# Patient Record
Sex: FEMALE | Race: WHITE
[De-identification: names, ages, dates, MRNs, and addresses within clinical notes are randomized per-mention and may not be internally consistent; named-entity substitution may affect disease eponyms.]

---

## 2019-06-06 ENCOUNTER — HOSPITAL ENCOUNTER (OUTPATIENT)
Dept: HOSPITAL 43 - DL.US | Age: 29
End: 2019-06-06
Attending: FAMILY MEDICINE
Payer: COMMERCIAL

## 2019-06-06 DIAGNOSIS — Z3A.11: ICD-10-CM

## 2019-06-06 DIAGNOSIS — O26.841: Primary | ICD-10-CM

## 2019-06-06 DIAGNOSIS — O20.8: ICD-10-CM

## 2019-12-26 ENCOUNTER — HOSPITAL ENCOUNTER (INPATIENT)
Dept: HOSPITAL 43 - DL.OBCHECK | Age: 29
LOS: 2 days | Discharge: HOME | DRG: 560 | End: 2019-12-28
Attending: FAMILY MEDICINE | Admitting: FAMILY MEDICINE
Payer: COMMERCIAL

## 2019-12-26 DIAGNOSIS — Z3A.40: ICD-10-CM

## 2019-12-26 DIAGNOSIS — D64.9: ICD-10-CM

## 2019-12-26 DIAGNOSIS — O48.0: Primary | ICD-10-CM

## 2019-12-26 NOTE — OBOUT
DATE:  12/26/2019

 

TIME OF NST:  6:12 to 6:32.

 

REASON FOR NST:

1. Intrauterine pregnancy at 40 weeks by 6 and 1/7 weeks ultrasound.

2. GBS negative.

3. Maternal anemia with hemoglobin of 11.3 upon admission.

4. Gestational hypertension versus preeclampsia versus transient hypertension.

5. History of asthma.

6. G2, P1-0-0-1.

 

NST INTERPRETATION:  During this time period, fetal heart tone baseline is

approximately 155 and there are at least two 15 x 15 beats per minute

accelerations making this strip reactive.  It is also noted to be reassuring.

Tocometer reveals potential of 1 contraction not felt by the patient.

 

Blood pressure 157/74, recheck 146/76; heart rate between 97 to 98; temperature

97.1.

 

ASSESSMENT:

1. Nonstress test, reactive and reassuring.

2. Tocometer with 1 contraction, not felt by the patient.

 

PLAN:  Please see H and P, which was done through epic.  Please see chart to be

scanned, updated sticker was placed.

 

CONCERNS:  New diagnosis includes transient hypertension versus gestational

hypertension versus preeclampsia and just recent recheck of blood pressure is

151/66 with a heart rate of 87.  We will do PIH panel.  The patient currently

denies any headaches, visual changes, or upper abdominal pain.  For her H and P,

records were called for, reviewed, and supplemented by the patient's history as

well as review of systems remarkable for a mild cold, mild swelling in the feet.

No contractions, spotting, bleeding, leaking, headaches, visual changes, or

upper abdominal pain.  Otherwise, review of systems fully reviewed and felt to

be noncontributory.

 

DD:  12/26/2019 07:50:34

DT:  12/26/2019 13:15:35

[facility]

Job #:  [id number]

## 2019-12-26 NOTE — PCM.SN
- Free Text/Narrative


Note: 





Intrathecal. Sitting position, sterile prep and drape. 15 mg lidocaine w bicarb 

for skinwheal to L2 L3 interspace x 2, introducer and 24 ga pencan x 2. pos CSF

, neg heme, neg parasthesia. 0.1 ml pf 1:1000 epi, 15 mcg pf sufenta, 35 mcg pf 

fentanyl, 0.4 ml pf ns and 6 mg of 0.75% pf bupivacaine injected after CSF 

aspiration. Pt to L lateral position. Procedure time 2320 to 2350

## 2019-12-26 NOTE — PN
DATE:  12/26/2019

 

SUBJECTIVE:  The patient is feeling contractions.

 

OBJECTIVE:  Vital Signs:  Last blood pressure 151/66, heart rate 87.

Genitourinary:  Fetal heart tones in the 140s to 150s range.  Tocometer reveals

occasional contraction, none felt by patient.  Vaginal exam reveals her to be

1.5 cm, 60% effaced, -2 to -3 station, vertex suspected, and Cytotec 25 mcg was

placed after discussion with the patient.

 

ASSESSMENT AND PLAN:

1. Intrauterine pregnancy at 40 weeks by 6 and 1/7 weeks ultrasound, GBS

    negative, G2, P1-0-0-1.

2. History of asthma with gestational hypertension versus preeclampsia versus

    transient hypertension.

 

We will continue to follow clinically and closely for signs and symptoms of

severe preeclampsia.  PIH panel will be done and follow thereafter.  The patient

understands and agrees with the above treatment plan.

 

DD:  12/26/2019 07:51:44

DT:  12/26/2019 11:00:26

[facility]

Job #:  [id number]

## 2019-12-27 PROCEDURE — 0HQ9XZZ REPAIR PERINEUM SKIN, EXTERNAL APPROACH: ICD-10-PCS | Performed by: FAMILY MEDICINE

## 2019-12-27 PROCEDURE — 10H07YZ INSERTION OF OTHER DEVICE INTO PRODUCTS OF CONCEPTION, VIA NATURAL OR ARTIFICIAL OPENING: ICD-10-PCS | Performed by: FAMILY MEDICINE

## 2019-12-27 PROCEDURE — 10907ZC DRAINAGE OF AMNIOTIC FLUID, THERAPEUTIC FROM PRODUCTS OF CONCEPTION, VIA NATURAL OR ARTIFICIAL OPENING: ICD-10-PCS | Performed by: FAMILY MEDICINE

## 2019-12-27 RX ADMIN — VITAMIN A, ASCORBIC ACID, CHOLECALCIFEROL, .ALPHA.-TOCOPHEROL ACETATE, DL-, THIAMINE MONONITRATE, RIBOFLAVIN, NIACINAMIDE, PYRIDOXINE HYDROCHLORIDE, FOLIC ACID, CYANOCOBALAMIN, CALCIUM CARBONATE, IRON, ZINC OXIDE, AND CUPRIC OXIDE SCH EACH: 4000; 120; 400; 22; 1.84; 3; 20; 10; 1; 12; 200; 29; 25; 2 TABLET ORAL at 10:45

## 2019-12-27 NOTE — OBOUT
DATE:  2019

 

SUBJECTIVE:  The patient is now status post intrathecal.  She is feeling

comfortable, but is nauseated.

 

OBJECTIVE:  O2 sats 97%.  Fetal heart tones in the 120s to 130s range with what

appears to be early decelerations, but tocometer has poor readings.  Vaginal

exam reveals her to be 6 to 7 cm.  Artificial rupture of membranes done after

discussion with the patient.  IUPC placed for further monitoring of contractions

after discussion with the patient.  Copious amounts of fluid were obtained with

some bloody show noted.  IUPC was placed without difficulty and tested with

cough with a spike noted.

 

Pitocin has been stopped due to the concerns with fetal heart tones, and her

nausea has been managed with CRNA.

 

ASSESSMENT:  Intrauterine pregnancy, now at 40-1/7 weeks by 6-1/7 week

ultrasound.  Group B Streptococcus negative.  Gestational hypertension.  

2, para 1-0-0-1 with history of asthma, now nearing the second stage of labor

with rapid cervical dilation noted to be 4 cm, approximately an hour to an hour

a half ago and now at 6 to 7 cm with artificial rupture of membranes and

intrauterine pressure catheter placed for further monitoring.

 

PLAN:  We will continue to follow clinically and closely at this point in time.

Plans were discussed with the patient.  She understands and agrees.

 

DD:  2019 00:25:37

DT:  2019 01:56:28

[facility]

Job #:  [id number]

## 2019-12-27 NOTE — DEL
DATE:  2019

 

PREOPERATIVE DIAGNOSES:

1. Intrauterine pregnancy at 40-1/7 weeks by 6-1/7 week ultrasound.

2. Group B Streptococcus negative.

3. Maternal anemia with hemoglobin 11.3 upon admission.

4. Gestational hypertension with preeclampsia ruled out.

5. History of asthma.

6.  2, para 1-0-0-1.

 

POSTOPERATIVE DIAGNOSES:

1. Intrauterine pregnancy at 40-1/7 weeks by 6-1/7 week ultrasound-delivered.

2. Group B Streptococcus negative.

3. Maternal anemia with hemoglobin 11.3 upon admission.

4. Gestational hypertension with preeclampsia ruled out.

5. History of asthma.

6.  2, para 1-0-0-1.

7. First-degree perineal laceration that was bleeding requiring repair.

 

PROCEDURE PERFORMED:  On 2019, NST followed by Cytotec x1, Pitocin

augmentation on 2019, artificial rupture membranes, intrauterine pressure

catheter placement, and spontaneous vaginal delivery with first-degree perineal

laceration, repaired.

 

ANESTHESIA/ANALGESIA:  The patient did receive nitrous oxide in the first stage

of labor as well as an intrathecal.

 

ESTIMATED BLOOD LOSS:  200 mL.

 

FINDINGS:  Male.  Apgar score and weight pending.

 

SUMMARY OF EVENTS:  The patient is a G2, P1-0-0-1 intrauterine pregnancy at 40

weeks on date of admission, underwent NST followed by Cytotec x1 and then

Pitocin augmentation.  She slowly progressed with cervical dilation.  She was

found to be 4+ cm, wanting something for pain and received an intrathecal.

Thereafter, she became very nauseated and was followed closely.  She did receive

a dose of Pepcid as well as another dose of Zofran.  During that time period,

she was evaluated and found to have rapid dilation to 7 cm and then 8 cm.  I was

called to the room as there were concerns with some decelerations.

Repositioning and oxygen were done with minimal improvement.  Therefore, she was

evaluated and found to be complete and the patient started pushing with

contractions.  With approximately 2 to 3 contractions with pushing, fetal vertex

was delivered in WILLIAM presentation followed by anterior and posterior shoulder

without difficulty, and the rest of the infant was delivered.  Mouth and nares

were suctioned.  Cord was doubly clamped and cut, and infant was resuscitated on

mother's abdomen.

 

Approximately 10 mL of cord blood was obtained for  labs.  Placenta was

then delivered with gentle cord traction and fundal massage within 5 to 10

minutes.  Perineum, vagina, and perirectal areas were then examined and noted to

have a first-degree perineal laceration that was bleeding.  This was

subsequently repaired in the usual fashion using 3-0 Vicryl with good anesthetic

result from her intrathecal.  Mother and infant are currently stable at the time

of dictation.

 

DD:  2019 01:26:14

DT:  2019 01:52:07

[facility]

Job #:  [id number]

## 2019-12-27 NOTE — PN
DATE:  12/26/2019

 

SUBJECTIVE:  The patient's contractions are getting stronger.  She is on Pitocin

6 milliunits per minute.  Was unable to receive her second dose of Cytotec today

because of her contraction pattern.  Last blood pressure 143/78.  The patient

denies any headaches, visual changes, or upper abdominal pain.  Preeclampsia

labs came back negative for preeclampsia.  Fetal heart tones 140s to 150s with

accelerations in the 160s to 170s range.  Tocometer reveals contractions every 1-

1/2 to 2 minutes.  Vaginal exam reveals her to be 60% to 70% effaced, 2 cm

dilated, vertex suspected, and bag of water felt.  She has had a 0 station at

this point in time.

 

ASSESSMENT AND PLAN:  Intrauterine pregnancy at 40 weeks by 6-1/7 weeks'

ultrasound, now status post Cytotec x1 and currently on Pitocin augmentation

with some cervical change.  We will continue to follow clinically and closely.

The patient understands and agrees with the above treatment plan.  We will

follow for any signs or symptoms of severe preeclampsia as well.

 

DD:  12/26/2019 17:00:13

DT:  12/26/2019 18:29:51

[facility]

Job #:  [id number]

## 2019-12-28 VITALS — DIASTOLIC BLOOD PRESSURE: 84 MMHG | SYSTOLIC BLOOD PRESSURE: 140 MMHG | HEART RATE: 88 BPM

## 2019-12-28 RX ADMIN — VITAMIN A, ASCORBIC ACID, CHOLECALCIFEROL, .ALPHA.-TOCOPHEROL ACETATE, DL-, THIAMINE MONONITRATE, RIBOFLAVIN, NIACINAMIDE, PYRIDOXINE HYDROCHLORIDE, FOLIC ACID, CYANOCOBALAMIN, CALCIUM CARBONATE, IRON, ZINC OXIDE, AND CUPRIC OXIDE SCH EACH: 4000; 120; 400; 22; 1.84; 3; 20; 10; 1; 12; 200; 29; 25; 2 TABLET ORAL at 08:07

## 2019-12-28 NOTE — DISCH
ADMITTING DIAGNOSES:

1. Intrauterine pregnancy at 40 weeks' gestation based on 6-week ultrasound.

2. Group B strep negative.

3. Anemia of pregnancy, hemoglobin 11.3 on admission.

4. Gestational hypertension.

5. History of asthma.

6.  2, para 1-0-0-1.

 

DISCHARGE DIAGNOSES:

1. Intrauterine pregnancy at 40 weeks' gestation based on 6-week ultrasound.

2. Group B strep negative.

3. Anemia of pregnancy, hemoglobin 11.3 on admission.

4. Gestational hypertension.

5. History of asthma.

6.  2, now para 2-0-0-2, delivered at 40-1/7 weeks' gestation.

7. Status post spontaneous vaginal delivery with first-degree laceration

    repair.

 

PROCEDURES PERFORMED:

1. Induction of labor with Cytotec and Pitocin.

2. Artificial rupture of membranes.

3. Intrauterine pressure catheter monitoring.

4. Intrathecal anesthesia.

5. Spontaneous vaginal delivery.

6. First-degree laceration repair.

 

BRIEF HISTORY:  A 29-year-old female with the above-listed diagnoses, brought

into the hospital on her due date for elective induction of labor at term, found

to have some elevated blood pressures, and PIH labs ultimately negative.

Protein-creatinine ratio of 0.07.  Delivery itself was slow initial progress,

but went well overall.  She was complete and had an uncomplicated vaginal

delivery.  See delivery note for full details.  She had a term male infant

weighing 3610 g, Apgar scores of 8 and 9.

 

DISCHARGE CONDITION:  Good.  The patient is meeting discharge criteria,

ambulating, tolerating regular diet.  No chest pain or shortness of breath.

Bleeding has been controlled, and she has no other concerns.

 

PHYSICAL EXAMINATION:

Vital Signs:  Temperature is 98.4, pulse 88, blood pressure 140/84, respiratory

rate of 16, O2 saturations 99% on room air.  Previous blood pressure was 135/81.

Heart:  Regular without obvious murmur.

Lungs:  Clear to auscultation bilaterally.

Abdomen:  Soft and nontender.  Fundus is firm and below the umbilicus.

Extremities:  Trace edema.  No erythema or tenderness noted.

 

LABORATORY DATA:  Admission hemoglobin 11.3, discharge 11.0; hematocrit 34.1,

discharge 33; platelets 247, discharge 233.  PIH labs were negative,

specifically protein-creatinine ratio of 0.07.

 

DISPOSITION:  Home with family.

 

MEDICATIONS:

1. Ibuprofen 800 mg every 8 hours as needed for pain.

2. Tylenol 650 mg every 6 hours as needed for pain.

3. Iron 325 mg twice daily.

4. Colace 100 mg p.o. twice daily as needed for constipation.

5. Prenatal vitamin, continue 1 daily.

 

DISCHARGE INSTRUCTIONS:  The patient has a blood pressure cuff at home and will

continue to monitor her blood pressures.  They are minimally elevated, and I

expect them to resolve over the next few days, and she can recheck them in the

office on Monday when she brings her baby in to see Dr. Macedo.  She also is

educated on signs and symptoms of preeclampsia and that this can occur even in

the postpartum time-period.  So if she needs to be evaluated for symptoms or if

her blood pressures are getting higher, specifically higher than 150/95, that

she should come back in for re-evaluation, and the patient is agreeable to that

and her questions have been answered.  Otherwise, she has also been provided

with routine post vaginal delivery postpartum cares and all of her questions

were answered.

 

DD:  2019 15:04:14

DT:  2019 15:27:46

[facility]

Job #:  [id number]

## 2019-12-31 ENCOUNTER — HOSPITAL ENCOUNTER (EMERGENCY)
Dept: HOSPITAL 43 - DL.ED | Age: 29
Discharge: HOME | End: 2019-12-31
Payer: COMMERCIAL

## 2019-12-31 VITALS — HEART RATE: 104 BPM | SYSTOLIC BLOOD PRESSURE: 143 MMHG | DIASTOLIC BLOOD PRESSURE: 90 MMHG

## 2019-12-31 DIAGNOSIS — J45.909: ICD-10-CM

## 2019-12-31 DIAGNOSIS — R10.30: Primary | ICD-10-CM

## 2019-12-31 DIAGNOSIS — Z79.899: ICD-10-CM

## 2019-12-31 LAB
ANION GAP SERPL CALC-SCNC: 16 MMOL/L
CHLORIDE SERPL-SCNC: 103 MMOL/L (ref 101–111)
SODIUM SERPL-SCNC: 136 MMOL/L (ref 135–145)

## 2019-12-31 PROCEDURE — 87086 URINE CULTURE/COLONY COUNT: CPT

## 2019-12-31 PROCEDURE — 96360 HYDRATION IV INFUSION INIT: CPT

## 2019-12-31 PROCEDURE — 87186 SC STD MICRODIL/AGAR DIL: CPT

## 2019-12-31 PROCEDURE — 36415 COLL VENOUS BLD VENIPUNCTURE: CPT

## 2019-12-31 PROCEDURE — 85025 COMPLETE CBC W/AUTO DIFF WBC: CPT

## 2019-12-31 PROCEDURE — 74019 RADEX ABDOMEN 2 VIEWS: CPT

## 2019-12-31 PROCEDURE — 99284 EMERGENCY DEPT VISIT MOD MDM: CPT

## 2019-12-31 PROCEDURE — 87088 URINE BACTERIA CULTURE: CPT

## 2019-12-31 PROCEDURE — 81001 URINALYSIS AUTO W/SCOPE: CPT

## 2019-12-31 PROCEDURE — 80053 COMPREHEN METABOLIC PANEL: CPT

## 2019-12-31 NOTE — EDM.PDOC
Scribed by Екатерина Rizvi 12/31/19 1233 for Sanchez Jack PA





ED HPI GENERAL MEDICAL PROBLEM





- General


Chief Complaint: Abdominal Pain


Stated Complaint: ABDOMINAL PAIN


Time Seen by Provider: 12/31/19 11:45


Source of Information: Reports: Patient, RN, RN Notes Reviewed


History Limitations: Reports: No Limitations





- History of Present Illness


INITIAL COMMENTS - FREE TEXT/NARRATIVE: 


Patient is a 29-year-old female who reports with lower abdominal "cramping pain

". The patient reports her pain started this A.M. at 10:00. The patient has an 

appointment at the clinic at 13:00, but thought the pain was too bad, so came 

to ED. Patient had a vaginal birth on 12/27/19 and teran had some cramping since. 

The patient reports she took one 200mg Ibuprofen but still ha cramping. 


Onset: Today


Duration: Getting Worse


Location: Reports: Abdomen


Quality: Reports: Ache


Severity: Mild


Improves with: Reports: None


Worsens with: Reports: None


Associated Symptoms: Reports: No Other Symptoms


Treatments PTA: Reports: Acetaminophen


  ** Suprapubic


Pain Score (Numeric/FACES): 9





- Related Data


 Allergies











Allergy/AdvReac Type Severity Reaction Status Date / Time


 


No Known Allergies Allergy   Verified 12/26/19 09:25











Home Meds: 


 Home Meds





Ferrous Sulfate 325 mg PO BID 12/26/19 [History]


Prenatal Vit/FA/Fe Fumarate/Se [Prenatal MTR] 1 tab PO DAILY 12/26/19 [History]


Acetaminophen [Tylenol] 650 mg PO Q4H PRN  tablet 12/28/19 [Rx]


Docusate Sodium [Colace] 100 mg PO BID PRN  cap 12/28/19 [Rx]


Ibuprofen [Motrin] 800 mg PO Q8H PRN  tablet 12/28/19 [Rx]











Past Medical History


HEENT History: Reports: None


Cardiovascular History: Reports: None


Respiratory History: Reports: Asthma


Gastrointestinal History: Reports: Other (See Below)


Other Gastrointestinal History: ulcer


Genitourinary History: Reports: None


OB/GYN History: Reports: Pregnancy


Musculoskeletal History: Reports: None


Neurological History: Reports: None


Psychiatric History: Reports: None


Endocrine/Metabolic History: Reports: None


Hematologic History: Reports: Anemia


Immunologic History: Reports: None


Oncologic (Cancer) History: Reports: None


Dermatologic History: Reports: None





- Infectious Disease History


Infectious Disease History: Reports: None





- Past Surgical History


Head Surgeries/Procedures: Reports: None





Social & Family History





- Family History


Family Medical History: Noncontributory





- Tobacco Use


Smoking Status *Q: Never Smoker


Second Hand Smoke Exposure: No





- Caffeine Use


Caffeine Use: Reports: None





- Recreational Drug Use


Recreational Drug Use: No





- Living Situation & Occupation


Living situation: Reports: with Family


Occupation: Employed





ED ROS GENERAL





- Review of Systems


Review Of Systems: Comprehensive ROS is negative, except as noted in HPI.





ED EXAM, GI/ABD





- Physical Exam


Exam: See Below


Exam Limited By: No Limitations


General Appearance: Alert, WD/WN, No Apparent Distress


Eyes: Bilateral: Normal Appearance


Ears: Normal External Exam, Normal Canal, Hearing Grossly Normal, Normal TMs


Nose: Normal Inspection, Normal Mucosa, No Blood


Throat/Mouth: Normal Inspection, Normal Lips, Normal Teeth, Normal Gums, Normal 

Oropharynx, Normal Voice, No Airway Compromise


Head: Atraumatic, Normocephalic


Neck: Normal Inspection, Supple, Non-Tender, Full Range of Motion


Respiratory/Chest: No Respiratory Distress, Lungs Clear, Normal Breath Sounds, 

No Accessory Muscle Use, Chest Non-Tender


Cardiovascular: Normal Peripheral Pulses, Regular Rate, Rhythm, No Edema, No 

Gallop, No JVD, No Murmur, No Rub


GI/Abdominal Exam: Other (lower abdominal tenderness)


 (Female) Exam: Deferred


Rectal (Female) Exam: Deferred


Back Exam: Normal Inspection, Full Range of Motion, NT


Extremities: Normal Inspection, Normal Range of Motion, Non-Tender, Normal 

Capillary Refill, No Pedal Edema


Neurological: Alert, Oriented, CN II-XII Intact, Normal Cognition, Normal Gait, 

Normal Reflexes, No Motor/Sensory Deficits


Psychiatric: Normal Affect, Normal Mood


Skin Exam: Warm, Dry, Intact, Normal Color, No Rash


Lymphatic: No Adenopathy





Course





- Vital Signs


Last Recorded V/S: 


 Last Vital Signs











Temp  36.3 C   12/31/19 11:30


 


Pulse  104 H  12/31/19 11:30


 


Resp  18   12/31/19 11:30


 


BP  143/90 H  12/31/19 11:30


 


Pulse Ox  97   12/31/19 11:30














- Orders/Labs/Meds


Orders: 


 Active Orders 24 hr











 Category Date Time Status


 


 UA RFX RAVINDRA AND CULT IF INDIC [URIN] Stat Lab  12/31/19 11:26 Ordered


 


 Sodium Chloride 0.9% [Normal Saline] 1,000 ml Med  12/31/19 11:45 Ordered





 IV .BOLUS   








 Medication Orders





Sodium Chloride (Normal Saline)  1,000 mls @ 999 mls/hr IV .BOLUS ONE


   Stop: 12/31/19 12:45


   Last Admin: 12/31/19 12:00  Dose: 999 mls/hr








Labs: 





 Laboratory Tests











  12/31/19 12/31/19 Range/Units





  11:54 11:54 


 


WBC  10.1 H   (5.0-10.0)  10^3/uL


 


RBC  4.11 L   (4.2-5.4)  10^6/uL


 


Hgb  11.8 L   (12.0-16.0)  g/dL


 


Hct  35.3 L   (37.0-47.0)  %


 


MCV  85.9   ()  fL


 


MCH  28.7   (27.0-34.0)  pg


 


MCHC  33.4   (33.0-35.0)  g/dL


 


Plt Count  332  D   (150-450)  10^3/uL


 


Neut % (Auto)  76.8 H   (42.2-75.2)  %


 


Lymph % (Auto)  14.5 L   (20.5-50.1)  %


 


Mono % (Auto)  6.2   (2-8)  %


 


Eos % (Auto)  2.2   (1.0-3.0)  %


 


Baso % (Auto)  0.3   (0.0-1.0)  %


 


Sodium   136  (135-145)  mmol/L


 


Potassium   4.0  (3.6-5.0)  mmol/L


 


Chloride   103  (101-111)  mmol/L


 


Carbon Dioxide   21.0  (21.0-31.0)  mmol/L


 


Anion Gap   16.0  


 


BUN   11  (7-18)  mg/dL


 


Creatinine   0.7  (0.6-1.3)  mg/dL


 


Est Cr Clr Drug Dosing   93.79  mL/min


 


Estimated GFR (MDRD)   > 60  


 


BUN/Creatinine Ratio   15.71  


 


Glucose   90  ()  mg/dL


 


Calcium   8.7  (8.4-10.2)  mg/dl


 


Total Bilirubin   0.8  (0.2-1.0)  mg/dL


 


AST   27  (10-42)  IU/L


 


ALT   32  (10-60)  IU/L


 


Alkaline Phosphatase   114  ()  IU/L


 


Total Protein   7.1  (6.7-8.2)  g/dl


 


Albumin   3.1 L  (3.2-5.5)  g/dl


 


Globulin   4.0  


 


Albumin/Globulin Ratio   0.78  











Meds: 


Medications











Generic Name Dose Route Start Last Admin





  Trade Name Ammon  PRN Reason Stop Dose Admin


 


Sodium Chloride  1,000 mls @ 999 mls/hr  12/31/19 11:45  12/31/19 12:00





  Normal Saline  IV  12/31/19 12:45  999 mls/hr





  .BOLUS ONE   Administration





     





     





     





     














Departure





- Departure


Time of Disposition: 12:30


Disposition: Home, Self-Care 01


Condition: Fair


Clinical Impression: 


 Abdominal cramping








- Discharge Information


*PRESCRIPTION DRUG MONITORING PROGRAM REVIEWED*: Not Applicable


*COPY OF PRESCRIPTION DRUG MONITORING REPORT IN PATIENT COURTNEY: Not Applicable


Forms:  ED Department Discharge


Care Plan Goals: 


The patient was advised of the examination and lab results during the visit. 

The patient was advised to take Tylenol (650 mg) every 8 hours as needed for 

pain. The patient was encouraged to rest and relax. If the patient has any 

additional symptoms or concerns, the patient should either return to the 

emergency department or visit her primary care facility. 





Sepsis Event Note





- Evaluation


Sepsis Screening Result: No Definite Risk





- Focused Exam


Vital Signs: 


 Vital Signs











  Temp Pulse Resp BP Pulse Ox


 


 12/31/19 11:30  36.3 C  104 H  18  143/90 H  97











Date Exam was Performed: 12/31/19


Time Exam was Performed: 12:30





- My Orders


Last 24 Hours: 





My Active Orders





12/31/19 11:26


UA RFX RAVINDRA AND CULT IF INDIC [URIN] Stat 





12/31/19 11:45


Sodium Chloride 0.9% [Normal Saline] 1,000 ml IV .BOLUS 














- Assessment/Plan


Last 24 Hours: 





My Active Orders





12/31/19 11:26


UA RFX RAVINDRA AND CULT IF INDIC [URIN] Stat 





12/31/19 11:45


Sodium Chloride 0.9% [Normal Saline] 1,000 ml IV .BOLUS 














I have read and agree with the documentation that has been completed regarding 

this visit. By signing this record, I attest that the documentation was 

completed in my physical presence and is an accurate record of the encounter.

## 2020-07-10 ENCOUNTER — HOSPITAL ENCOUNTER (EMERGENCY)
Dept: HOSPITAL 43 - DL.ED | Age: 30
Discharge: HOME | End: 2020-07-10
Payer: COMMERCIAL

## 2020-07-10 VITALS — DIASTOLIC BLOOD PRESSURE: 78 MMHG | HEART RATE: 136 BPM | SYSTOLIC BLOOD PRESSURE: 157 MMHG

## 2020-07-10 DIAGNOSIS — J45.909: ICD-10-CM

## 2020-07-10 DIAGNOSIS — N94.6: Primary | ICD-10-CM

## 2020-07-10 PROCEDURE — 96372 THER/PROPH/DIAG INJ SC/IM: CPT

## 2020-07-10 PROCEDURE — 99283 EMERGENCY DEPT VISIT LOW MDM: CPT

## 2020-07-10 NOTE — EDM.PDOC
ED HPI GENERAL MEDICAL PROBLEM





- General


Chief Complaint: Abdominal Pain


Stated Complaint: PAIN IN LOWER BOTTOM


Time Seen by Provider: 07/10/20 13:10


Source of Information: Reports: Patient, RN


History Limitations: Reports: No Limitations





- History of Present Illness


INITIAL COMMENTS - FREE TEXT/NARRATIVE: 


 30 year old female who presents to the ER with complains of menstrual cramps. 

Patient reports her menses began two days ago with cramping which have gradually

worsened. She has not tried anything for the pain. She reports cramping of an 

8/10. She states bleeding have subsided. She denies any fever/chills or urinary 

symptoms.





Onset Date: 07/08/20


Duration: Day(s):


Location: Reports: Abdomen


Quality: Reports: Other (camping)


Improves with: Reports: None


  ** Lower Abdominal


Pain Score (Numeric/FACES): 9





- Related Data


                                    Allergies











Allergy/AdvReac Type Severity Reaction Status Date / Time


 


No Known Allergies Allergy   Verified 07/10/20 13:08











Home Meds: 


                                    Home Meds





Ferrous Sulfate 325 mg PO BID 12/26/19 [History]


Prenatal Vit/FA/Fe Fumarate/Se [Prenatal MTR] 1 tab PO DAILY 12/26/19 [History]


Acetaminophen [Tylenol] 650 mg PO Q4H PRN  tablet 12/28/19 [Rx]


Docusate Sodium [Colace] 100 mg PO BID PRN  cap 12/28/19 [Rx]


Ibuprofen [Motrin] 800 mg PO Q8H PRN  tablet 12/28/19 [Rx]


Levonorgestrel/Ethin.estradiol [Lillow-28 Tablet] 1 tab PO DAILY 07/10/20 

[History]











Past Medical History


HEENT History: Reports: None


Cardiovascular History: Reports: None


Respiratory History: Reports: Asthma


Gastrointestinal History: Reports: Other (See Below)


Other Gastrointestinal History: ulcer


Genitourinary History: Reports: None


OB/GYN History: Reports: Pregnancy


Musculoskeletal History: Reports: None


Neurological History: Reports: None


Psychiatric History: Reports: None


Endocrine/Metabolic History: Reports: None


Hematologic History: Reports: Anemia


Immunologic History: Reports: None


Oncologic (Cancer) History: Reports: None


Dermatologic History: Reports: None





- Infectious Disease History


Infectious Disease History: Reports: Chicken Pox





- Past Surgical History


Head Surgeries/Procedures: Reports: None





Social & Family History





- Family History


Family Medical History: Noncontributory





- Tobacco Use


Smoking Status *Q: Never Smoker


Second Hand Smoke Exposure: No





- Caffeine Use


Caffeine Use: Reports: None





- Recreational Drug Use


Recreational Drug Use: No





- Living Situation & Occupation


Living situation: Reports: with Family


Occupation: Employed





ED ROS GENERAL





- Review of Systems


Review Of Systems: Comprehensive ROS is negative, except as noted in HPI.





ED EXAM, GI/ABD





- Physical Exam


Exam: See Below


Exam Limited By: No Limitations


General Appearance: Alert, Moderate Distress


Respiratory/Chest: No Respiratory Distress, Lungs Clear, Normal Breath Sounds, 

No Accessory Muscle Use, Chest Non-Tender


Cardiovascular: Normal Peripheral Pulses, Regular Rate, Rhythm


GI/Abdominal Exam: Normal Bowel Sounds, Soft, No Organomegaly, No Distention, 

Tender (mild suprapubic tenderness)


 (Female) Exam: Deferred


Rectal (Female) Exam: Deferred


Back Exam: Normal Inspection, Full Range of Motion.  No: CVA Tenderness (L), CVA

 Tenderness (R)


Neurological: Alert, Oriented, CN II-XII Intact, Normal Cognition, Normal Gait, 

Normal Reflexes, No Motor/Sensory Deficits


Psychiatric: Normal Affect, Normal Mood


Skin Exam: Warm, Dry, Intact, Normal Color, No Rash


Lymphatic: No Adenopathy





Course





- Vital Signs


Last Recorded V/S: 





                                Last Vital Signs











Temp  97.8 F   07/10/20 13:04


 


Pulse  136 H  07/10/20 13:04


 


Resp  20   07/10/20 13:04


 


BP  157/78 H  07/10/20 13:04


 


Pulse Ox  100   07/10/20 13:04














- Orders/Labs/Meds


Meds: 





Medications














Discontinued Medications














Generic Name Dose Route Start Last Admin





  Trade Name Ammon  PRN Reason Stop Dose Admin


 


Ketorolac Tromethamine  30 mg  07/10/20 13:16 





  Toradol  IM  07/10/20 13:17 





  ONETIME ONE  














- Re-Assessments/Exams


Free Text/Narrative Re-Assessment/Exam: 


Reviewed exam findings with patient. Toradol 30 mg IM administered with 

significant relief in symptoms. Rx for Toradol 10 mg PO # 5 send home with 

patient. Encouraged to push fluids and follow up with PCP.





Departure





- Departure


Time of Disposition: 13:59


Disposition: Home, Self-Care 01


Condition: Good


Clinical Impression: 


 Menstrual cramps








- Discharge Information


Instructions:  Dysmenorrhea


Additional Instructions: 


Take Toradol 10 mg every 8 hours as needed for discomfort. Push fluids and rest.

 Follow up with PCP





Sepsis Event Note (ED)





- Evaluation


Sepsis Screening Result: No Definite Risk





- Focused Exam


Vital Signs: 





                                   Vital Signs











  Temp Pulse Resp BP Pulse Ox


 


 07/10/20 13:04  97.8 F  136 H  20  157/78 H  100